# Patient Record
Sex: MALE | Race: BLACK OR AFRICAN AMERICAN | NOT HISPANIC OR LATINO | Employment: FULL TIME | URBAN - METROPOLITAN AREA
[De-identification: names, ages, dates, MRNs, and addresses within clinical notes are randomized per-mention and may not be internally consistent; named-entity substitution may affect disease eponyms.]

---

## 2023-03-22 ENCOUNTER — TELEPHONE (OUTPATIENT)
Dept: PRIMARY CARE | Facility: CLINIC | Age: 33
End: 2023-03-22

## 2023-06-12 ASSESSMENT — PROMIS GLOBAL HEALTH SCALE
RATE_GENERAL_HEALTH: EXCELLENT
RATE_AVERAGE_PAIN: 0
RATE_PHYSICAL_HEALTH: EXCELLENT
RATE_MENTAL_HEALTH: GOOD
CARRYOUT_PHYSICAL_ACTIVITIES: COMPLETELY
RATE_QUALITY_OF_LIFE: EXCELLENT
CARRYOUT_SOCIAL_ACTIVITIES: EXCELLENT
EMOTIONAL_PROBLEMS: RARELY
RATE_SOCIAL_SATISFACTION: EXCELLENT

## 2023-06-14 ENCOUNTER — LAB (OUTPATIENT)
Dept: LAB | Facility: LAB | Age: 33
End: 2023-06-14
Payer: MEDICAID

## 2023-06-14 ENCOUNTER — OFFICE VISIT (OUTPATIENT)
Dept: PRIMARY CARE | Facility: CLINIC | Age: 33
End: 2023-06-14
Payer: MEDICAID

## 2023-06-14 VITALS
WEIGHT: 215 LBS | HEART RATE: 97 BPM | BODY MASS INDEX: 29.57 KG/M2 | TEMPERATURE: 97.6 F | OXYGEN SATURATION: 97 % | SYSTOLIC BLOOD PRESSURE: 155 MMHG | DIASTOLIC BLOOD PRESSURE: 75 MMHG

## 2023-06-14 DIAGNOSIS — Z00.00 HEALTHCARE MAINTENANCE: Primary | ICD-10-CM

## 2023-06-14 DIAGNOSIS — Z00.00 HEALTHCARE MAINTENANCE: ICD-10-CM

## 2023-06-14 DIAGNOSIS — I10 HYPERTENSION, UNSPECIFIED TYPE: ICD-10-CM

## 2023-06-14 PROBLEM — E66.3 OVERWEIGHT: Status: ACTIVE | Noted: 2023-06-14

## 2023-06-14 LAB
ALANINE AMINOTRANSFERASE (SGPT) (U/L) IN SER/PLAS: 29 U/L (ref 10–52)
ALBUMIN (G/DL) IN SER/PLAS: 4.6 G/DL (ref 3.4–5)
ALKALINE PHOSPHATASE (U/L) IN SER/PLAS: 55 U/L (ref 33–120)
ANION GAP IN SER/PLAS: 13 MMOL/L (ref 10–20)
ASPARTATE AMINOTRANSFERASE (SGOT) (U/L) IN SER/PLAS: 16 U/L (ref 9–39)
BASOPHILS (10*3/UL) IN BLOOD BY AUTOMATED COUNT: 0.06 X10E9/L (ref 0–0.1)
BASOPHILS/100 LEUKOCYTES IN BLOOD BY AUTOMATED COUNT: 1.1 % (ref 0–2)
BILIRUBIN TOTAL (MG/DL) IN SER/PLAS: 1.3 MG/DL (ref 0–1.2)
CALCIUM (MG/DL) IN SER/PLAS: 9.6 MG/DL (ref 8.6–10.6)
CARBON DIOXIDE, TOTAL (MMOL/L) IN SER/PLAS: 31 MMOL/L (ref 21–32)
CHLORIDE (MMOL/L) IN SER/PLAS: 102 MMOL/L (ref 98–107)
CHOLESTEROL (MG/DL) IN SER/PLAS: 187 MG/DL (ref 0–199)
CHOLESTEROL IN HDL (MG/DL) IN SER/PLAS: 56.7 MG/DL
CHOLESTEROL/HDL RATIO: 3.3
CREATININE (MG/DL) IN SER/PLAS: 1.28 MG/DL (ref 0.5–1.3)
EOSINOPHILS (10*3/UL) IN BLOOD BY AUTOMATED COUNT: 0.17 X10E9/L (ref 0–0.7)
EOSINOPHILS/100 LEUKOCYTES IN BLOOD BY AUTOMATED COUNT: 3.1 % (ref 0–6)
ERYTHROCYTE DISTRIBUTION WIDTH (RATIO) BY AUTOMATED COUNT: 12.7 % (ref 11.5–14.5)
ERYTHROCYTE MEAN CORPUSCULAR HEMOGLOBIN CONCENTRATION (G/DL) BY AUTOMATED: 34 G/DL (ref 32–36)
ERYTHROCYTE MEAN CORPUSCULAR VOLUME (FL) BY AUTOMATED COUNT: 92 FL (ref 80–100)
ERYTHROCYTES (10*6/UL) IN BLOOD BY AUTOMATED COUNT: 5.21 X10E12/L (ref 4.5–5.9)
GFR MALE: 75 ML/MIN/1.73M2
GLUCOSE (MG/DL) IN SER/PLAS: 88 MG/DL (ref 74–99)
HEMATOCRIT (%) IN BLOOD BY AUTOMATED COUNT: 47.9 % (ref 41–52)
HEMOGLOBIN (G/DL) IN BLOOD: 16.3 G/DL (ref 13.5–17.5)
IMMATURE GRANULOCYTES/100 LEUKOCYTES IN BLOOD BY AUTOMATED COUNT: 0.2 % (ref 0–0.9)
LDL: 82 MG/DL (ref 0–99)
LEUKOCYTES (10*3/UL) IN BLOOD BY AUTOMATED COUNT: 5.6 X10E9/L (ref 4.4–11.3)
LYMPHOCYTES (10*3/UL) IN BLOOD BY AUTOMATED COUNT: 2.73 X10E9/L (ref 1.2–4.8)
LYMPHOCYTES/100 LEUKOCYTES IN BLOOD BY AUTOMATED COUNT: 49.1 % (ref 13–44)
MONOCYTES (10*3/UL) IN BLOOD BY AUTOMATED COUNT: 0.6 X10E9/L (ref 0.1–1)
MONOCYTES/100 LEUKOCYTES IN BLOOD BY AUTOMATED COUNT: 10.8 % (ref 2–10)
NEUTROPHILS (10*3/UL) IN BLOOD BY AUTOMATED COUNT: 1.99 X10E9/L (ref 1.2–7.7)
NEUTROPHILS/100 LEUKOCYTES IN BLOOD BY AUTOMATED COUNT: 35.7 % (ref 40–80)
NON HDL CHOLESTEROL: 130 MG/DL
NRBC (PER 100 WBCS) BY AUTOMATED COUNT: 0 /100 WBC (ref 0–0)
PLATELETS (10*3/UL) IN BLOOD AUTOMATED COUNT: 182 X10E9/L (ref 150–450)
POTASSIUM (MMOL/L) IN SER/PLAS: 3.7 MMOL/L (ref 3.5–5.3)
PROTEIN TOTAL: 7.1 G/DL (ref 6.4–8.2)
SODIUM (MMOL/L) IN SER/PLAS: 142 MMOL/L (ref 136–145)
TRIGLYCERIDE (MG/DL) IN SER/PLAS: 240 MG/DL (ref 0–149)
UREA NITROGEN (MG/DL) IN SER/PLAS: 16 MG/DL (ref 6–23)
VLDL: 48 MG/DL (ref 0–40)

## 2023-06-14 PROCEDURE — 85025 COMPLETE CBC W/AUTO DIFF WBC: CPT

## 2023-06-14 PROCEDURE — 80053 COMPREHEN METABOLIC PANEL: CPT

## 2023-06-14 PROCEDURE — 1036F TOBACCO NON-USER: CPT | Performed by: STUDENT IN AN ORGANIZED HEALTH CARE EDUCATION/TRAINING PROGRAM

## 2023-06-14 PROCEDURE — 3077F SYST BP >= 140 MM HG: CPT | Performed by: STUDENT IN AN ORGANIZED HEALTH CARE EDUCATION/TRAINING PROGRAM

## 2023-06-14 PROCEDURE — 99395 PREV VISIT EST AGE 18-39: CPT | Performed by: STUDENT IN AN ORGANIZED HEALTH CARE EDUCATION/TRAINING PROGRAM

## 2023-06-14 PROCEDURE — 36415 COLL VENOUS BLD VENIPUNCTURE: CPT

## 2023-06-14 PROCEDURE — 80061 LIPID PANEL: CPT

## 2023-06-14 PROCEDURE — 3078F DIAST BP <80 MM HG: CPT | Performed by: STUDENT IN AN ORGANIZED HEALTH CARE EDUCATION/TRAINING PROGRAM

## 2023-06-14 RX ORDER — MELOXICAM 15 MG/1
15 TABLET ORAL
COMMUNITY
Start: 2021-01-13

## 2023-06-14 RX ORDER — CYCLOBENZAPRINE HCL 5 MG
TABLET ORAL
COMMUNITY
Start: 2017-11-03 | End: 2024-01-23 | Stop reason: SDUPTHER

## 2023-06-14 RX ORDER — HYDROCHLOROTHIAZIDE 25 MG/1
25 TABLET ORAL DAILY
COMMUNITY
Start: 2023-02-06 | End: 2023-06-14 | Stop reason: SDUPTHER

## 2023-06-14 RX ORDER — HYDROCHLOROTHIAZIDE 25 MG/1
25 TABLET ORAL DAILY
Qty: 90 TABLET | Refills: 3 | Status: SHIPPED | OUTPATIENT
Start: 2023-06-14 | End: 2024-06-13

## 2023-06-14 RX ORDER — AMLODIPINE BESYLATE 5 MG/1
1 TABLET ORAL DAILY
COMMUNITY
Start: 2021-12-15 | End: 2023-06-14 | Stop reason: SDUPTHER

## 2023-06-14 RX ORDER — NAPROXEN 500 MG/1
500 TABLET ORAL EVERY 12 HOURS PRN
COMMUNITY
Start: 2017-09-07

## 2023-06-14 RX ORDER — AMLODIPINE BESYLATE 5 MG/1
5 TABLET ORAL DAILY
Qty: 90 TABLET | Refills: 3 | Status: SHIPPED | OUTPATIENT
Start: 2023-06-14 | End: 2024-06-13

## 2023-06-14 NOTE — PATIENT INSTRUCTIONS
Please stop at the lab ([address]) to complete your blood and/or urine work that I've ordered for you.    I will contact you with the results at my soonest convenience. I strongly urge you to use "[employer] as this is the quickest and easiest way to access your results and recieve my correspondences.    I have renewed your chronic medications today    Shoot me a message if there are any issues with your medications.

## 2023-06-14 NOTE — PROGRESS NOTES
"Subjective   Patient ID: Tricia Dykes is a 32 y.o. male who presents for No chief complaint on file..    HPI   No acute concerns today. Here for annual.    Re: HTN  - Not at goal today. Compliance has been an issue \"ever since the  switched\". Willing to go back on his meds though. Reports no sx high or low from HTN; denies blurry vision, HA, dizziness LoC CP SoB Marcum and leg swelling     Re: Prostate - see urology note. Reassurance given.     PMHx, FHx, Social Hx, Surg Hx personally reviewed at this appointment. No pertinent findings and/or changes from prior (if applicable).    ROS: Denies wt gain/loss f/c HA LoC CP SOB NVDC. See HPI above, and scanned sheet (if applicable). All other systems are reviewed and are without complaint.     Review of Systems    Objective   /75   Pulse 97   Temp 36.4 °C (97.6 °F)   Wt 97.5 kg (215 lb)   SpO2 97%   BMI 29.57 kg/m²     Physical Exam  Gen: overweight, NAD. AAO x3.  HEENT: NC/AT. Anicteric sclera, symmetric pupils. MMM no thrush.  Neck: Soft, supple. No LAD. No goiter.   CV: RRR nl s1s2 no m/r/g  Pulm: CTAB no w/r/r, good air exchange  GI: soft NTND BS+ no hsm  Ext: WWP no edema  Neuro: II-XII grossly intact, nonfocal systemic findings  MSK: 5/5 strength b/l UE and LE  Gait: unremarkable    Lab Results   Component Value Date    WBC 5.0 10/31/2022    HGB 17.1 10/31/2022    HCT 50.1 10/31/2022     10/31/2022    CHOL 194 06/09/2021    TRIG 202 (H) 06/09/2021    HDL 51.9 06/09/2021    ALT 29 10/31/2022    AST 18 10/31/2022     10/31/2022    K 4.1 10/31/2022     10/31/2022    CREATININE 1.24 10/31/2022    BUN 13 10/31/2022    CO2 30 10/31/2022    PSA 0.49 10/31/2022    INR 1.1 03/14/2022    HGBA1C 5.4 06/09/2021     par     Electrocardiogram 12 Lead  Normal sinus rhythm  Normal ECG  No previous ECGs available  Please see ED Provider Note for formal interpretation  Confirmed by NIK ACOSTA MD ([id number]) on 6/17/2021 5:40:24 " AM       Assessment/Plan '    # HTN: not at goal  - ambulatory pressures  - compliance with meds discussed at length  - renew chronic meds  - routine blood/urine work, if not recent  - lifestyle modifications discussed at length     # Obesity: BMI ~ 30  - counselled on wt loss via diet, exercise, and other lifestyle modifications    # Health Maintenance  - routine blood work  - Colon Cancer Screening: Not yet indicated   - PSA: Not yet indicated   - Immunizations: UTD  - AAA screening:  Not yet indicated       Problem List Items Addressed This Visit    None  Visit Diagnoses       Healthcare maintenance    -  Primary    Relevant Orders    CBC and Auto Differential    Comprehensive Metabolic Panel    Lipid Panel    Hypertension, unspecified type

## 2023-12-22 ENCOUNTER — HOSPITAL ENCOUNTER (EMERGENCY)
Facility: HOSPITAL | Age: 33
Discharge: HOME | End: 2023-12-22
Payer: MEDICAID

## 2023-12-22 VITALS
TEMPERATURE: 97.3 F | HEART RATE: 78 BPM | RESPIRATION RATE: 18 BRPM | BODY MASS INDEX: 28.71 KG/M2 | OXYGEN SATURATION: 98 % | HEIGHT: 72 IN | SYSTOLIC BLOOD PRESSURE: 142 MMHG | DIASTOLIC BLOOD PRESSURE: 98 MMHG | WEIGHT: 212 LBS

## 2023-12-22 DIAGNOSIS — Z20.2 STD EXPOSURE: Primary | ICD-10-CM

## 2023-12-22 LAB — HIV 1+2 AB+HIV1 P24 AG SERPL QL IA: NONREACTIVE

## 2023-12-22 PROCEDURE — 87661 TRICHOMONAS VAGINALIS AMPLIF: CPT | Mod: 59,AHULAB | Performed by: PHYSICIAN ASSISTANT

## 2023-12-22 PROCEDURE — 99283 EMERGENCY DEPT VISIT LOW MDM: CPT

## 2023-12-22 PROCEDURE — 87800 DETECT AGNT MULT DNA DIREC: CPT | Mod: AHULAB | Performed by: PHYSICIAN ASSISTANT

## 2023-12-22 PROCEDURE — 87389 HIV-1 AG W/HIV-1&-2 AB AG IA: CPT | Mod: AHULAB | Performed by: PHYSICIAN ASSISTANT

## 2023-12-22 PROCEDURE — 86780 TREPONEMA PALLIDUM: CPT | Mod: AHULAB | Performed by: PHYSICIAN ASSISTANT

## 2023-12-22 PROCEDURE — 87109 MYCOPLASMA: CPT | Performed by: PHYSICIAN ASSISTANT

## 2023-12-22 PROCEDURE — 36415 COLL VENOUS BLD VENIPUNCTURE: CPT | Performed by: PHYSICIAN ASSISTANT

## 2023-12-22 RX ORDER — DOXYCYCLINE 100 MG/1
100 CAPSULE ORAL 2 TIMES DAILY
Qty: 20 CAPSULE | Refills: 0 | Status: SHIPPED | OUTPATIENT
Start: 2023-12-22 | End: 2024-01-01

## 2023-12-22 ASSESSMENT — COLUMBIA-SUICIDE SEVERITY RATING SCALE - C-SSRS
2. HAVE YOU ACTUALLY HAD ANY THOUGHTS OF KILLING YOURSELF?: NO
6. HAVE YOU EVER DONE ANYTHING, STARTED TO DO ANYTHING, OR PREPARED TO DO ANYTHING TO END YOUR LIFE?: NO
1. IN THE PAST MONTH, HAVE YOU WISHED YOU WERE DEAD OR WISHED YOU COULD GO TO SLEEP AND NOT WAKE UP?: NO

## 2023-12-22 NOTE — ED PROVIDER NOTES
HPI   Chief Complaint   Patient presents with    Exposure to STD       33-year-old male presents requesting testing for STD.  Partner reported being treated for Ureaplasma infection with doxycycline.  Patient is asymptomatic no urethral discharge dysuria frequency urgency hematuria.  No penile scrotal or testicular pain or swelling.  No abdominal pelvic pain.  No joint pain.  No rashes.  History of hypertension no other chronic medical conditions.      History provided by:  Patient   used: No                        No data recorded                Patient History   Past Medical History:   Diagnosis Date    Elevated blood-pressure reading, without diagnosis of hypertension 03/20/2019    Elevated BP without diagnosis of hypertension    Headache, unspecified 03/20/2019    Chronic headaches    Personal history of other diseases of the nervous system and sense organs 03/20/2019    History of tension headache    Right lower quadrant pain 02/16/2016    Abdominal pain, RLQ (right lower quadrant)     No past surgical history on file.  No family history on file.  Social History     Tobacco Use    Smoking status: Never    Smokeless tobacco: Never   Substance Use Topics    Alcohol use: Not Currently    Drug use: Never       Physical Exam   ED Triage Vitals [12/22/23 1340]   Temp Heart Rate Resp BP   36.3 °C (97.3 °F) 76 15 160/82      SpO2 Temp src Heart Rate Source Patient Position   98 % -- -- --      BP Location FiO2 (%)     -- --       Physical Exam  Vitals and nursing note reviewed.   Constitutional:       Appearance: Normal appearance.   HENT:      Head: Normocephalic and atraumatic.   Eyes:      Extraocular Movements: Extraocular movements intact.      Pupils: Pupils are equal, round, and reactive to light.   Cardiovascular:      Rate and Rhythm: Normal rate and regular rhythm.   Pulmonary:      Effort: Pulmonary effort is normal.   Abdominal:      Palpations: Abdomen is soft.      Tenderness: There is  no abdominal tenderness.   Musculoskeletal:         General: Normal range of motion.   Skin:     General: Skin is warm and dry.   Neurological:      General: No focal deficit present.      Mental Status: He is alert and oriented to person, place, and time.   Psychiatric:         Mood and Affect: Mood normal.         Behavior: Behavior normal.         Thought Content: Thought content normal.         Judgment: Judgment normal.         ED Course & MDM   Diagnoses as of 12/22/23 1422   STD exposure       Medical Decision Making  STD testing.  Patient is asymptomatic.  Known exposure to Ureaplasma.    Prescribe doxycycline.  Patient aware that STD testing results in 3 days.  Will follow-up with phone bruce for results.  Advised if other infections present he may need further treatment.  Stable for discharge follow-up PCP or STD clinic.    Amount and/or Complexity of Data Reviewed  Labs: ordered.     Details: STD testing pending.  Patient aware results in 3 days.  Will follow-up with phone bruce for results.    Risk  Prescription drug management.        Procedure  Procedures     Nino Roman PA-C  12/22/23 142       Nino Roman PA-C  12/22/23 [id number]

## 2023-12-23 LAB
C TRACH RRNA SPEC QL NAA+PROBE: NEGATIVE
N GONORRHOEA DNA SPEC QL PROBE+SIG AMP: NEGATIVE
T PALLIDUM AB SER QL: NONREACTIVE
T VAGINALIS RRNA SPEC QL NAA+PROBE: NEGATIVE

## 2024-01-23 ENCOUNTER — OFFICE VISIT (OUTPATIENT)
Dept: PRIMARY CARE | Facility: CLINIC | Age: 34
End: 2024-01-23
Payer: MEDICAID

## 2024-01-23 ENCOUNTER — LAB (OUTPATIENT)
Dept: LAB | Facility: LAB | Age: 34
End: 2024-01-23
Payer: MEDICAID

## 2024-01-23 VITALS
TEMPERATURE: 97 F | SYSTOLIC BLOOD PRESSURE: 145 MMHG | HEIGHT: 72 IN | OXYGEN SATURATION: 97 % | HEART RATE: 83 BPM | BODY MASS INDEX: 27.09 KG/M2 | WEIGHT: 200 LBS | DIASTOLIC BLOOD PRESSURE: 79 MMHG

## 2024-01-23 DIAGNOSIS — S16.1XXD STRAIN OF NECK MUSCLE, SUBSEQUENT ENCOUNTER: ICD-10-CM

## 2024-01-23 DIAGNOSIS — Z00.00 HEALTHCARE MAINTENANCE: Primary | ICD-10-CM

## 2024-01-23 DIAGNOSIS — I10 HYPERTENSION, UNSPECIFIED TYPE: ICD-10-CM

## 2024-01-23 DIAGNOSIS — Z00.00 HEALTHCARE MAINTENANCE: ICD-10-CM

## 2024-01-23 LAB
ALBUMIN SERPL BCP-MCNC: 4.6 G/DL (ref 3.4–5)
ALP SERPL-CCNC: 57 U/L (ref 33–120)
ALT SERPL W P-5'-P-CCNC: 28 U/L (ref 10–52)
ANION GAP SERPL CALC-SCNC: 12 MMOL/L (ref 10–20)
AST SERPL W P-5'-P-CCNC: 13 U/L (ref 9–39)
BASOPHILS # BLD AUTO: 0.05 X10*3/UL (ref 0–0.1)
BASOPHILS NFR BLD AUTO: 0.7 %
BILIRUB SERPL-MCNC: 1.8 MG/DL (ref 0–1.2)
BUN SERPL-MCNC: 16 MG/DL (ref 6–23)
CALCIUM SERPL-MCNC: 9.9 MG/DL (ref 8.6–10.6)
CHLORIDE SERPL-SCNC: 103 MMOL/L (ref 98–107)
CHOLEST SERPL-MCNC: 155 MG/DL (ref 0–199)
CHOLESTEROL/HDL RATIO: 3.1
CO2 SERPL-SCNC: 29 MMOL/L (ref 21–32)
CREAT SERPL-MCNC: 1.2 MG/DL (ref 0.5–1.3)
EGFRCR SERPLBLD CKD-EPI 2021: 81 ML/MIN/1.73M*2
EOSINOPHIL # BLD AUTO: 0.12 X10*3/UL (ref 0–0.7)
EOSINOPHIL NFR BLD AUTO: 1.8 %
ERYTHROCYTE [DISTWIDTH] IN BLOOD BY AUTOMATED COUNT: 12.7 % (ref 11.5–14.5)
GLUCOSE SERPL-MCNC: 99 MG/DL (ref 74–99)
HCT VFR BLD AUTO: 48.9 % (ref 41–52)
HDLC SERPL-MCNC: 49.7 MG/DL
HGB BLD-MCNC: 16.5 G/DL (ref 13.5–17.5)
IMM GRANULOCYTES # BLD AUTO: 0.02 X10*3/UL (ref 0–0.7)
IMM GRANULOCYTES NFR BLD AUTO: 0.3 % (ref 0–0.9)
LDLC SERPL CALC-MCNC: 81 MG/DL
LYMPHOCYTES # BLD AUTO: 2.06 X10*3/UL (ref 1.2–4.8)
LYMPHOCYTES NFR BLD AUTO: 30.2 %
MCH RBC QN AUTO: 30.6 PG (ref 26–34)
MCHC RBC AUTO-ENTMCNC: 33.7 G/DL (ref 32–36)
MCV RBC AUTO: 91 FL (ref 80–100)
MONOCYTES # BLD AUTO: 0.51 X10*3/UL (ref 0.1–1)
MONOCYTES NFR BLD AUTO: 7.5 %
NEUTROPHILS # BLD AUTO: 4.06 X10*3/UL (ref 1.2–7.7)
NEUTROPHILS NFR BLD AUTO: 59.5 %
NON HDL CHOLESTEROL: 105 MG/DL (ref 0–149)
NRBC BLD-RTO: 0 /100 WBCS (ref 0–0)
PLATELET # BLD AUTO: 181 X10*3/UL (ref 150–450)
POTASSIUM SERPL-SCNC: 3.4 MMOL/L (ref 3.5–5.3)
PROT SERPL-MCNC: 6.9 G/DL (ref 6.4–8.2)
RBC # BLD AUTO: 5.4 X10*6/UL (ref 4.5–5.9)
SODIUM SERPL-SCNC: 141 MMOL/L (ref 136–145)
TRIGL SERPL-MCNC: 121 MG/DL (ref 0–149)
VLDL: 24 MG/DL (ref 0–40)
WBC # BLD AUTO: 6.8 X10*3/UL (ref 4.4–11.3)

## 2024-01-23 PROCEDURE — 3078F DIAST BP <80 MM HG: CPT | Performed by: STUDENT IN AN ORGANIZED HEALTH CARE EDUCATION/TRAINING PROGRAM

## 2024-01-23 PROCEDURE — 85025 COMPLETE CBC W/AUTO DIFF WBC: CPT

## 2024-01-23 PROCEDURE — 80061 LIPID PANEL: CPT

## 2024-01-23 PROCEDURE — 99395 PREV VISIT EST AGE 18-39: CPT | Performed by: STUDENT IN AN ORGANIZED HEALTH CARE EDUCATION/TRAINING PROGRAM

## 2024-01-23 PROCEDURE — 1036F TOBACCO NON-USER: CPT | Performed by: STUDENT IN AN ORGANIZED HEALTH CARE EDUCATION/TRAINING PROGRAM

## 2024-01-23 PROCEDURE — 3077F SYST BP >= 140 MM HG: CPT | Performed by: STUDENT IN AN ORGANIZED HEALTH CARE EDUCATION/TRAINING PROGRAM

## 2024-01-23 PROCEDURE — 80053 COMPREHEN METABOLIC PANEL: CPT

## 2024-01-23 PROCEDURE — 36415 COLL VENOUS BLD VENIPUNCTURE: CPT

## 2024-01-23 RX ORDER — CYCLOBENZAPRINE HCL 10 MG
10 TABLET ORAL 3 TIMES DAILY
Qty: 30 TABLET | Refills: 3 | Status: SHIPPED | OUTPATIENT
Start: 2024-01-23 | End: 2024-02-02

## 2024-01-23 NOTE — PROGRESS NOTES
Subjective   Patient ID: Tricia Dykes is a 33 y.o. male who presents for No chief complaint on file..    HPI     Annual;    Re: HTN  - Not at goal today. Didn't take his meds today as he sustained a minor neck injury. Reports no sx high or low from HTN; denies blurry vision, HA, dizziness LoC CP SoB Marcum and leg swelling      Re: neck - sustained minor neck injury with a fall on the ice this morning as it was very slippery. Requesting work note and meds to help him. Requesting one day off of work to help get better too. Reasonable.       PMHx, FHx, Social Hx, Surg Hx personally reviewed at this appointment. No pertinent findings and/or changes from prior (if applicable).     ROS: Denies wt gain/loss f/c HA LoC CP SOB NVDC. See HPI above, and scanned sheet (if applicable). All other systems are reviewed and are without complaint.       Review of Systems    Objective   /79   Pulse 83   Temp 36.1 °C (97 °F)   Ht 1.829 m (6')   Wt 90.7 kg (200 lb)   SpO2 97%   BMI 27.12 kg/m²     Physical Exam  Gen: overweight, NAD. AAO x3.  HEENT: NC/AT. Anicteric sclera, symmetric pupils. MMM no thrush.  Neck: Soft, supple. No LAD. No goiter.   CV: RRR nl s1s2 no m/r/g  Pulm: CTAB no w/r/r, good air exchange  GI: soft NTND BS+ no hsm  Ext: WWP no edema  Neuro: II-XII grossly intact, nonfocal systemic findings  MSK: 5/5 strength b/l UE and LE  Gait: unremarkable    Lab Results   Component Value Date    WBC 5.6 06/14/2023    HGB 16.3 06/14/2023    HCT 47.9 06/14/2023     06/14/2023    CHOL 187 06/14/2023    TRIG 240 (H) 06/14/2023    HDL 56.7 06/14/2023    ALT 29 06/14/2023    AST 16 06/14/2023     06/14/2023    K 3.7 06/14/2023     06/14/2023    CREATININE 1.28 06/14/2023    BUN 16 06/14/2023    CO2 31 06/14/2023    PSA 0.49 10/31/2022    INR 1.1 03/14/2022    HGBA1C 5.4 06/09/2021     par    Electrocardiogram 12 Lead  Normal sinus rhythm  Normal ECG  No previous ECGs available  Please see ED Provider Note  for formal interpretation  Confirmed by NIK ACOSTA MD ([id number]) on 6/17/2021 5:40:24 AM         Assessment/Plan   # HTN: not at goal  - ambulatory pressures  - compliance with meds discussed at length  - renew chronic meds  - lifestyle modifications discussed at length      # Overweight: trending in right direction  - continue lifestyle modifications    # neck pain: subacute on chronic   - conservative management with PT, massage therapy, acupuncture, or chiropractic services  - trial of medrol dosepak and/or PRN cyclobenzaprine   - if above ineffective, will re-evaluate and consider imaging at that time      # Health Maintenance  - routine blood work  - Colon Cancer Screening: Not yet indicated   - PSA: Not yet indicated   - Immunizations: UTD  - AAA screening:  Not yet indicated

## 2024-01-23 NOTE — LETTER
January 23, 2024     Patient: Tricia Dykes   YOB: 1990   Date of Visit: 1/23/2024       To Whom It May Concern:    Tricia Dykes was seen in my clinic on 1/23/2024 at 2:20 pm. Please excuse Tricia for his absence from work on this day to make the appointment.     He also suffered a recent neck strain. Please excuse him from work on 1/24/2024 if his pain does not improve by tomorrow. I anticipate him back to work without restriction by Thursday 1/25/2024.     If you have any questions or concerns, please don't hesitate to call.         Sincerely,         Justin Hess MD        CC: No Recipients

## 2024-01-23 NOTE — PATIENT INSTRUCTIONS
Please stop at the lab ([address]) to complete your blood and/or urine work that I've ordered for you.    I will contact you with the results at my soonest convenience. I strongly urge you to use [employer] as this is the quickest and easiest way to access your results and receive my correspondences.     I have renewed your chronic medications today.     I have given you a work note.

## 2024-05-30 ENCOUNTER — APPOINTMENT (OUTPATIENT)
Dept: PRIMARY CARE | Facility: CLINIC | Age: 34
End: 2024-05-30
Payer: MEDICAID

## 2024-06-04 ENCOUNTER — APPOINTMENT (OUTPATIENT)
Dept: PRIMARY CARE | Facility: CLINIC | Age: 34
End: 2024-06-04
Payer: MEDICAID

## 2024-07-17 ENCOUNTER — DOCUMENTATION (OUTPATIENT)
Dept: GENETICS | Facility: CLINIC | Age: 34
End: 2024-07-17
Payer: MEDICAID

## 2024-07-17 ENCOUNTER — LAB (OUTPATIENT)
Dept: LAB | Facility: LAB | Age: 34
End: 2024-07-17
Payer: MEDICAID

## 2024-07-17 DIAGNOSIS — Z31.440 ENCOUNTER OF MALE FOR TESTING FOR GENETIC DISEASE CARRIER STATUS FOR PROCREATIVE MANAGEMENT: ICD-10-CM

## 2024-07-17 NOTE — PROGRESS NOTES
Patient was present for his partner's (Wellington Siddiqui) prenatal genetic counseling appointment on 07/17/2024. She is a carrier of alpha-thal trait and HbC trait. After a review of the methodology, benefits, and limitations of carrier screening, he elected to proceed with the 176-gene universal carrier screen from [facility]. He was handed a kit and was informed that he can go to any  lab service location to have his blood drawn. Results will be available 2-3 weeks after his blood is drawn, and I will call him to review results and discuss next steps when they are available.     Sincerely,   Thea Morris MS, [facility] [place]  Licensed and Certified Genetic Counselor   [phone]    Reviewed by:  Dr. Saniya Mcwilliams MD  Maternal Fetal Medicine

## 2024-07-19 ASSESSMENT — PROMIS GLOBAL HEALTH SCALE
RATE_PHYSICAL_HEALTH: EXCELLENT
RATE_GENERAL_HEALTH: EXCELLENT
RATE_SOCIAL_SATISFACTION: EXCELLENT
RATE_MENTAL_HEALTH: EXCELLENT
RATE_AVERAGE_PAIN: 0
CARRYOUT_PHYSICAL_ACTIVITIES: COMPLETELY
EMOTIONAL_PROBLEMS: NEVER
CARRYOUT_SOCIAL_ACTIVITIES: EXCELLENT
RATE_QUALITY_OF_LIFE: EXCELLENT

## 2024-07-24 ENCOUNTER — LAB (OUTPATIENT)
Dept: LAB | Facility: LAB | Age: 34
End: 2024-07-24
Payer: MEDICAID

## 2024-07-24 ENCOUNTER — APPOINTMENT (OUTPATIENT)
Dept: PRIMARY CARE | Facility: CLINIC | Age: 34
End: 2024-07-24
Payer: MEDICAID

## 2024-07-24 VITALS
HEIGHT: 72 IN | TEMPERATURE: 97.8 F | DIASTOLIC BLOOD PRESSURE: 88 MMHG | SYSTOLIC BLOOD PRESSURE: 152 MMHG | HEART RATE: 73 BPM | WEIGHT: 215 LBS | BODY MASS INDEX: 29.12 KG/M2

## 2024-07-24 DIAGNOSIS — N40.0 ENLARGED PROSTATE: ICD-10-CM

## 2024-07-24 DIAGNOSIS — I10 BENIGN ESSENTIAL HYPERTENSION: ICD-10-CM

## 2024-07-24 DIAGNOSIS — Z00.00 HEALTHCARE MAINTENANCE: ICD-10-CM

## 2024-07-24 DIAGNOSIS — I10 HYPERTENSION, UNSPECIFIED TYPE: ICD-10-CM

## 2024-07-24 DIAGNOSIS — E66.3 OVERWEIGHT: ICD-10-CM

## 2024-07-24 DIAGNOSIS — I10 BENIGN ESSENTIAL HYPERTENSION: Primary | ICD-10-CM

## 2024-07-24 DIAGNOSIS — L30.9 DERMATITIS: ICD-10-CM

## 2024-07-24 LAB
ALBUMIN SERPL BCP-MCNC: 4.6 G/DL (ref 3.4–5)
ALP SERPL-CCNC: 59 U/L (ref 33–120)
ALT SERPL W P-5'-P-CCNC: 42 U/L (ref 10–52)
ANION GAP SERPL CALC-SCNC: 12 MMOL/L (ref 10–20)
AST SERPL W P-5'-P-CCNC: 18 U/L (ref 9–39)
BASOPHILS # BLD AUTO: 0.06 X10*3/UL (ref 0–0.1)
BASOPHILS NFR BLD AUTO: 1.3 %
BILIRUB SERPL-MCNC: 1.4 MG/DL (ref 0–1.2)
BUN SERPL-MCNC: 14 MG/DL (ref 6–23)
CALCIUM SERPL-MCNC: 9.3 MG/DL (ref 8.6–10.6)
CHLORIDE SERPL-SCNC: 103 MMOL/L (ref 98–107)
CO2 SERPL-SCNC: 29 MMOL/L (ref 21–32)
CREAT SERPL-MCNC: 1.18 MG/DL (ref 0.5–1.3)
EGFRCR SERPLBLD CKD-EPI 2021: 82 ML/MIN/1.73M*2
EOSINOPHIL # BLD AUTO: 0.12 X10*3/UL (ref 0–0.7)
EOSINOPHIL NFR BLD AUTO: 2.6 %
ERYTHROCYTE [DISTWIDTH] IN BLOOD BY AUTOMATED COUNT: 12.7 % (ref 11.5–14.5)
EST. AVERAGE GLUCOSE BLD GHB EST-MCNC: 105 MG/DL
GLUCOSE SERPL-MCNC: 89 MG/DL (ref 74–99)
HBA1C MFR BLD: 5.3 %
HCT VFR BLD AUTO: 47.9 % (ref 41–52)
HCV AB SER QL: NONREACTIVE
HGB BLD-MCNC: 16.4 G/DL (ref 13.5–17.5)
HIV 1+2 AB+HIV1 P24 AG SERPL QL IA: NONREACTIVE
IMM GRANULOCYTES # BLD AUTO: 0.01 X10*3/UL (ref 0–0.7)
IMM GRANULOCYTES NFR BLD AUTO: 0.2 % (ref 0–0.9)
LYMPHOCYTES # BLD AUTO: 2.32 X10*3/UL (ref 1.2–4.8)
LYMPHOCYTES NFR BLD AUTO: 50.4 %
MCH RBC QN AUTO: 30.7 PG (ref 26–34)
MCHC RBC AUTO-ENTMCNC: 34.2 G/DL (ref 32–36)
MCV RBC AUTO: 90 FL (ref 80–100)
MONOCYTES # BLD AUTO: 0.36 X10*3/UL (ref 0.1–1)
MONOCYTES NFR BLD AUTO: 7.8 %
NEUTROPHILS # BLD AUTO: 1.73 X10*3/UL (ref 1.2–7.7)
NEUTROPHILS NFR BLD AUTO: 37.7 %
NRBC BLD-RTO: 0 /100 WBCS (ref 0–0)
PLATELET # BLD AUTO: 185 X10*3/UL (ref 150–450)
POTASSIUM SERPL-SCNC: 3.5 MMOL/L (ref 3.5–5.3)
PROT SERPL-MCNC: 7.2 G/DL (ref 6.4–8.2)
PSA SERPL-MCNC: 0.65 NG/ML
RBC # BLD AUTO: 5.34 X10*6/UL (ref 4.5–5.9)
SODIUM SERPL-SCNC: 140 MMOL/L (ref 136–145)
WBC # BLD AUTO: 4.6 X10*3/UL (ref 4.4–11.3)

## 2024-07-24 PROCEDURE — 87389 HIV-1 AG W/HIV-1&-2 AB AG IA: CPT

## 2024-07-24 PROCEDURE — 3008F BODY MASS INDEX DOCD: CPT | Performed by: STUDENT IN AN ORGANIZED HEALTH CARE EDUCATION/TRAINING PROGRAM

## 2024-07-24 PROCEDURE — 83036 HEMOGLOBIN GLYCOSYLATED A1C: CPT

## 2024-07-24 PROCEDURE — 3079F DIAST BP 80-89 MM HG: CPT | Performed by: STUDENT IN AN ORGANIZED HEALTH CARE EDUCATION/TRAINING PROGRAM

## 2024-07-24 PROCEDURE — 3077F SYST BP >= 140 MM HG: CPT | Performed by: STUDENT IN AN ORGANIZED HEALTH CARE EDUCATION/TRAINING PROGRAM

## 2024-07-24 PROCEDURE — 80053 COMPREHEN METABOLIC PANEL: CPT

## 2024-07-24 PROCEDURE — 85025 COMPLETE CBC W/AUTO DIFF WBC: CPT

## 2024-07-24 PROCEDURE — 99214 OFFICE O/P EST MOD 30 MIN: CPT | Performed by: STUDENT IN AN ORGANIZED HEALTH CARE EDUCATION/TRAINING PROGRAM

## 2024-07-24 PROCEDURE — 36415 COLL VENOUS BLD VENIPUNCTURE: CPT

## 2024-07-24 PROCEDURE — 84153 ASSAY OF PSA TOTAL: CPT

## 2024-07-24 PROCEDURE — 86803 HEPATITIS C AB TEST: CPT

## 2024-07-24 RX ORDER — TRIAMCINOLONE ACETONIDE 1 MG/G
CREAM TOPICAL 2 TIMES DAILY PRN
Qty: 453.6 G | Refills: 0 | Status: SHIPPED | OUTPATIENT
Start: 2024-07-24

## 2024-07-24 RX ORDER — HYDROCHLOROTHIAZIDE 50 MG/1
50 TABLET ORAL DAILY
Qty: 90 TABLET | Refills: 3 | Status: SHIPPED | OUTPATIENT
Start: 2024-07-24 | End: 2025-07-24

## 2024-07-24 RX ORDER — AMLODIPINE BESYLATE 10 MG/1
10 TABLET ORAL DAILY
Qty: 90 TABLET | Refills: 3 | Status: SHIPPED | OUTPATIENT
Start: 2024-07-24 | End: 2025-07-24

## 2024-07-24 NOTE — PATIENT INSTRUCTIONS
Please stop at the lab ([address]) to complete your blood and/or urine work that I've ordered for you.    I will contact you with the results at my soonest convenience. I strongly urge you to use [employer] as this is the quickest and easiest way to access your results and receive my correspondences.    I have added or changed your medications today. See the medication section of this packet for full details.      Your blood pressure is not at target. Check your blood pressure daily with the newly increased doses.    Further recommendations will be made based on test results and how you fare clinically.

## 2024-07-24 NOTE — PROGRESS NOTES
Subjective   Patient ID: Tricia Dykes is a 34 y.o. male who presents for No chief complaint on file..    HPI   CPE already done this year, office visit.    Re: weight - up 15lb (200 -> 215) since last in. Still cycling regularly. He thinks most of the weight is muscle.     Re: HTN  - Not at goal today. Reports that he's taking the medications but pressures still not at goal.     Re: Dermatitis - small dermatitis on his hands. Requesting steroid cream to treat this as OTCs only so-so effective.     Re: prostate - mildly enlarged prostate on CT. Seen by urology, reassurance was provided. Patient requesting repeat PSA which is reasonable.      PMHx, FHx, Social Hx, Surg Hx personally reviewed at this appointment. No pertinent findings and/or changes from prior (if applicable).     ROS: Denies wt gain/loss f/c HA LoC CP SOB NVDC. See HPI above, and scanned sheet (if applicable). All other systems are reviewed and are without complaint.         Review of Systems    Objective   /88   Pulse 73   Temp 36.6 °C (97.8 °F)   Ht 1.829 m (6')   Wt 97.5 kg (215 lb)   BMI 29.16 kg/m²     Physical Exam  Gen: overweight, NAD. AAO x3.  HEENT: NC/AT. Anicteric sclera, symmetric pupils. MMM no thrush.  Neck: Soft, supple. No LAD. No goiter.   CV: RRR nl s1s2 no m/r/g  Pulm: CTAB no w/r/r, good air exchange  GI: soft NTND BS+ no hsm  Ext: WWP no edema  Neuro: II-XII grossly intact, nonfocal systemic findings  MSK: 5/5 strength b/l UE and LE  Gait: unremarkable  Skin: mild dermatitis on knuckles.     Lab Results   Component Value Date    WBC 6.8 01/23/2024    HGB 16.5 01/23/2024    HCT 48.9 01/23/2024     01/23/2024    CHOL 155 01/23/2024    TRIG 121 01/23/2024    HDL 49.7 01/23/2024    ALT 28 01/23/2024    AST 13 01/23/2024     01/23/2024    K 3.4 (L) 01/23/2024     01/23/2024    CREATININE 1.20 01/23/2024    BUN 16 01/23/2024    CO2 29 01/23/2024    PSA 0.49 10/31/2022    INR 1.1 03/14/2022    HGBA1C 5.4  06/09/2021     par    Electrocardiogram 12 Lead  Normal sinus rhythm  Normal ECG  No previous ECGs available  Please see ED Provider Note for formal interpretation  Confirmed by NIK ACOSTA MD ([id number]) on 6/17/2021 5:40:24 AM       Current Outpatient Medications   Medication Instructions    amLODIPine (NORVASC) 10 mg, oral, Daily    hydroCHLOROthiazide (HYDRODIURIL) 50 mg, oral, Daily, as directed    meloxicam (MOBIC) 15 mg, oral, Daily RT    naproxen (NAPROSYN) 500 mg, oral, Every 12 hours PRN        Assessment/Plan   Overall in stable health since last in. Would benefit from tighter BP control, will uptitrate those meds.     # HTN: not at goal  - ambulatory pressures, RTC 4-8 weeks with BP log  - routine blood/urine work, if not recent  - lifestyle modifications discussed at length   - increase CCB and hydrochlorothiazide    # Dermatitis  - steroid cream prescribed  - referral to dermatology if does not improve with this      # Overweight: mild uptrend  - continue lifestyle modifications    # mildly enlarged prostate  - repeat PSA requested, ordered     # Health Maintenance  - routine blood work  - Colon Cancer Screening: Not yet indicated   - PSA: as above  - Immunizations: UTD  - AAA screening:  Not yet indicated

## 2024-07-29 ENCOUNTER — TELEPHONE (OUTPATIENT)
Dept: GENETICS | Facility: CLINIC | Age: 34
End: 2024-07-29
Payer: MEDICAID

## 2024-07-29 NOTE — TELEPHONE ENCOUNTER
"The results of the 175-gene expanded carrier screen from [facility] were shared with Mr. Tricia Dykes ( 1990) and his partner, Wellington Siddiqui ( 1995) today by phone. The couple gave permission to share results with one another and to have this conversation documented together.     The technology, benefits, and limitations of carrier screening were again reviewed, including that it does not screen for all genetic conditions. Variants of unknown significance are not reported. Actual reproductive risks may be higher than quoted due to this fact, as well as the potential for some conditions to have digenic inheritance.     For the majority of the conditions tested, which are autosomal recessive, if a partner of a pregnancy is also a carrier, the chance to have an affected child is 1 in 4 (or 25%). On the other hand, for the autosomal recessive conditions tested, the chance to have a child with the condition is considerably lower if only one parent is a carrier for the condition. Normal carrier screening reduces the likelihood that a person is a carrier, but does not eliminate it. In most cases, carriers of an autosomal recessive genetic disorder are not expected to have symptoms.     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 Based on Wellington's previously negative carrier screen for this condition, she has a residual risk of 1 in 25,000 chance of being a carrier of this condition as well, and therefore there is currently a 1 in 100,000 risk of having a future affected child. Per the [employer] report, \"Isovaleric Acidemia (EVA), caused by mutations in theIVDgene, is an inherited disorder in which an enzyme that breaks down theamino acid leucine does not function properly resulting in accumulation isovaleric " "acid in the blood. High levels of isovaleric acid inthe blood can be toxic and result in damage to the brain and nervous system. EVA can result in seizures, coma, or other nervoussystem disorders. EVA can also be life-threatening. However, with early diagnosis and treatment, individuals with EVA are more likely tohave improved growth, development, and life expectancy.\"  Wellington is also a carrier of Hemoglobin C trait and alpha thalassemia trait. Reproductive risks for a beta-chain hemoglobinopathy or a severe form of alpha thalassemia are also <<1% based on Tricia's negative carrier status for those conditions.  Autosomal recessive inheritance. In these cases, both parents must have one nonworking copy of the same gene. Current reproductive risks are described above, and are <<1%. We again reviewed that had they been carriers for the same condition(s), reproductive risk would be 25% per pregnancy.   Other family members, Given Tricia's result, each of his parents and siblings are also at a 50% risk to be carriers of each condition described. Additionally, all future children will have a 50% chance of being a carrier of anything their parents are carriers for, and children should be made aware of this risk when they reach reproductive age. Should they desire carrier screening for future reproductive purposes, they can reach out to the [facility] for a prenatal or preconception appointment.  All babies born in the [facility] will be screened for a specific set of genetic conditions, including isovaleric acidemia, alpha thalassemia, and beta-chain related hemoglobinopathies, on the new born screen.    DISPOSITION:  Tricia Dykes and Wellington Siddiqui expressed understanding of the above information. No additional genetic testing is pending at this time.   Should they or their providers have additional questions about the information reviewed at today’s conversation, they can call my direct line at " [phone].    Sincerely,   Thea Morris MS, [facility] [place]  Licensed and Certified Genetic Counselor

## 2024-07-30 LAB — SCAN RESULT: NORMAL

## 2025-02-27 ENCOUNTER — APPOINTMENT (OUTPATIENT)
Dept: PRIMARY CARE | Facility: CLINIC | Age: 35
End: 2025-02-27
Payer: MEDICAID

## 2025-02-27 VITALS
SYSTOLIC BLOOD PRESSURE: 120 MMHG | TEMPERATURE: 96.9 F | BODY MASS INDEX: 29.12 KG/M2 | HEART RATE: 71 BPM | DIASTOLIC BLOOD PRESSURE: 79 MMHG | HEIGHT: 72 IN | WEIGHT: 215 LBS

## 2025-02-27 DIAGNOSIS — I10 HYPERTENSION, UNSPECIFIED TYPE: ICD-10-CM

## 2025-02-27 DIAGNOSIS — E66.3 OVERWEIGHT: ICD-10-CM

## 2025-02-27 DIAGNOSIS — E87.6 HYPOKALEMIA: ICD-10-CM

## 2025-02-27 DIAGNOSIS — I10 BENIGN ESSENTIAL HYPERTENSION: ICD-10-CM

## 2025-02-27 DIAGNOSIS — N40.0 ENLARGED PROSTATE: ICD-10-CM

## 2025-02-27 DIAGNOSIS — Z00.00 HEALTHCARE MAINTENANCE: Primary | ICD-10-CM

## 2025-02-27 PROCEDURE — 99395 PREV VISIT EST AGE 18-39: CPT | Performed by: STUDENT IN AN ORGANIZED HEALTH CARE EDUCATION/TRAINING PROGRAM

## 2025-02-27 PROCEDURE — 3074F SYST BP LT 130 MM HG: CPT | Performed by: STUDENT IN AN ORGANIZED HEALTH CARE EDUCATION/TRAINING PROGRAM

## 2025-02-27 PROCEDURE — 1036F TOBACCO NON-USER: CPT | Performed by: STUDENT IN AN ORGANIZED HEALTH CARE EDUCATION/TRAINING PROGRAM

## 2025-02-27 PROCEDURE — 3008F BODY MASS INDEX DOCD: CPT | Performed by: STUDENT IN AN ORGANIZED HEALTH CARE EDUCATION/TRAINING PROGRAM

## 2025-02-27 PROCEDURE — 3078F DIAST BP <80 MM HG: CPT | Performed by: STUDENT IN AN ORGANIZED HEALTH CARE EDUCATION/TRAINING PROGRAM

## 2025-02-27 RX ORDER — AMLODIPINE BESYLATE 10 MG/1
10 TABLET ORAL DAILY
Qty: 90 TABLET | Refills: 3 | Status: SHIPPED | OUTPATIENT
Start: 2025-02-27 | End: 2026-02-27

## 2025-02-27 RX ORDER — HYDROCHLOROTHIAZIDE 50 MG/1
50 TABLET ORAL DAILY
Qty: 90 TABLET | Refills: 3 | Status: SHIPPED | OUTPATIENT
Start: 2025-02-27 | End: 2026-02-27

## 2025-02-27 ASSESSMENT — PAIN SCALES - GENERAL: PAINLEVEL_OUTOF10: 0-NO PAIN

## 2025-02-27 ASSESSMENT — PATIENT HEALTH QUESTIONNAIRE - PHQ9
2. FEELING DOWN, DEPRESSED OR HOPELESS: NOT AT ALL
1. LITTLE INTEREST OR PLEASURE IN DOING THINGS: NOT AT ALL
SUM OF ALL RESPONSES TO PHQ9 QUESTIONS 1 AND 2: 0

## 2025-02-27 NOTE — PATIENT INSTRUCTIONS
Please stop at any  lab ([address] if you choose to use my building) to complete your blood and/or urine work that I've ordered for you.    I will contact you with the results at my soonest convenience. I strongly urge you to use [employer] as this is the quickest and easiest way to access your results and receive my correspondences.      I have renewed your chronic medications today.     I recommend that you lose weight via lifestyle modifications such as diet and exercise.     See me yearly for a complete physical exam, and sooner as needed for sick visits.

## 2025-02-27 NOTE — PROGRESS NOTES
Subjective   Patient ID: Tricia Dykes is a 34 y.o. male who presents for Annual Exam.    HPI   New job, doing [employer] now. Family doing well. Annual visit today.    Re: HTN - at goal on combo therapy. Reports no sx high or low from HTN; denies blurry vision, HA, dizziness LoC CP SoB Marcum and leg swelling     Re: neck pain -resolved.    Re: overweight - BMI 29. Contemplative about weight loss.    PMHx, FHx, Social Hx, Surg Hx personally reviewed at this appointment. No pertinent findings and/or changes from prior (if applicable).    ROS: Denies wt gain/loss f/c HA LoC CP SOB NVDC. See HPI above, and scanned sheet (if applicable). All other systems are reviewed and are without complaint.     Review of Systems    Objective   /79 (BP Location: Left arm, Patient Position: Sitting, BP Cuff Size: Adult)   Pulse 71   Temp 36.1 °C (96.9 °F) (Temporal)   Ht 1.829 m (6')   Wt 97.5 kg (215 lb)   BMI 29.16 kg/m²     Physical Exam  Gen: overweight, NAD. AAO x3.  HEENT: NC/AT. Anicteric sclera, symmetric pupils. MMM no thrush.  Neck: Soft, supple. No LAD. No goiter.   CV: RRR nl s1s2 no m/r/g  Pulm: CTAB no w/r/r, good air exchange  GI: soft NTND BS+ no hsm  Ext: WWP no edema  Neuro: II-XII grossly intact, nonfocal systemic findings  MSK: 5/5 strength b/l UE and LE  Gait: unremarkable  Skin: mild dermatitis on knuckles. Festooned with tattoos.     Lab Results   Component Value Date    WBC 4.6 07/24/2024    HGB 16.4 07/24/2024    HCT 47.9 07/24/2024     07/24/2024    CHOL 155 01/23/2024    TRIG 121 01/23/2024    HDL 49.7 01/23/2024    ALT 42 07/24/2024    AST 18 07/24/2024     07/24/2024    K 3.5 07/24/2024     07/24/2024    CREATININE 1.18 07/24/2024    BUN 14 07/24/2024    CO2 29 07/24/2024    PSA 0.65 07/24/2024    INR 1.1 03/14/2022    HGBA1C 5.3 07/24/2024     par    Electrocardiogram 12 Lead  Normal sinus rhythm  Normal ECG  No previous ECGs available  Please see ED Provider Note for  formal interpretation  Confirmed by NIK ACOSTA MD ([id number]) on 6/17/2021 5:40:24 AM      Current Outpatient Medications   Medication Instructions    amLODIPine (NORVASC) 10 mg, oral, Daily    hydroCHLOROthiazide (HYDRODIURIL) 50 mg, oral, Daily, as directed    meloxicam (MOBIC) 15 mg, oral, Daily RT    naproxen (NAPROSYN) 500 mg, oral, Every 12 hours PRN    triamcinolone (Kenalog) 0.1 % cream Topical, 2 times daily PRN, Apply to affected area 1-2 times daily as needed. Avoid face and groin.      Assessment/Plan   Stable since last in. BP controlled.    # HTN: at/near goal  - continue current regimen  - routine lab work if not recent  - continue lifestyle modifications     # Overweight  - counselled on weight loss via diet, exercise, and other lifestyle modifications      # Health Maintenance  - routine blood work  - Colon Cancer Screening: Not yet indicated   - PSA: due, ordered today   - Immunizations: Declined  - AAA screening:  not indicated

## 2025-02-28 LAB
ALBUMIN SERPL-MCNC: 5 G/DL (ref 3.6–5.1)
ALP SERPL-CCNC: 65 U/L (ref 36–130)
ALT SERPL-CCNC: 40 U/L (ref 9–46)
ANION GAP SERPL CALCULATED.4IONS-SCNC: 10 MMOL/L (CALC) (ref 7–17)
AST SERPL-CCNC: 18 U/L (ref 10–40)
BASOPHILS # BLD AUTO: 61 CELLS/UL (ref 0–200)
BASOPHILS NFR BLD AUTO: 1.2 %
BILIRUB SERPL-MCNC: 1.4 MG/DL (ref 0.2–1.2)
BUN SERPL-MCNC: 11 MG/DL (ref 7–25)
CALCIUM SERPL-MCNC: 10 MG/DL (ref 8.6–10.3)
CHLORIDE SERPL-SCNC: 96 MMOL/L (ref 98–110)
CHOLEST SERPL-MCNC: 193 MG/DL
CHOLEST/HDLC SERPL: 3 (CALC)
CO2 SERPL-SCNC: 33 MMOL/L (ref 20–32)
CREAT SERPL-MCNC: 1.07 MG/DL (ref 0.6–1.26)
EGFRCR SERPLBLD CKD-EPI 2021: 92 ML/MIN/1.73M2
EOSINOPHIL # BLD AUTO: 133 CELLS/UL (ref 15–500)
EOSINOPHIL NFR BLD AUTO: 2.6 %
ERYTHROCYTE [DISTWIDTH] IN BLOOD BY AUTOMATED COUNT: 13.6 % (ref 11–15)
GLUCOSE SERPL-MCNC: 83 MG/DL (ref 65–99)
HCT VFR BLD AUTO: 50.1 % (ref 38.5–50)
HDLC SERPL-MCNC: 64 MG/DL
HGB BLD-MCNC: 17.3 G/DL (ref 13.2–17.1)
LDLC SERPL CALC-MCNC: 113 MG/DL (CALC)
LYMPHOCYTES # BLD AUTO: 2310 CELLS/UL (ref 850–3900)
LYMPHOCYTES NFR BLD AUTO: 45.3 %
MCH RBC QN AUTO: 31.1 PG (ref 27–33)
MCHC RBC AUTO-ENTMCNC: 34.5 G/DL (ref 32–36)
MCV RBC AUTO: 89.9 FL (ref 80–100)
MONOCYTES # BLD AUTO: 500 CELLS/UL (ref 200–950)
MONOCYTES NFR BLD AUTO: 9.8 %
NEUTROPHILS # BLD AUTO: 2096 CELLS/UL (ref 1500–7800)
NEUTROPHILS NFR BLD AUTO: 41.1 %
NONHDLC SERPL-MCNC: 129 MG/DL (CALC)
PLATELET # BLD AUTO: 183 THOUSAND/UL (ref 140–400)
PMV BLD REES-ECKER: 11 FL (ref 7.5–12.5)
POTASSIUM SERPL-SCNC: 3.4 MMOL/L (ref 3.5–5.3)
PROT SERPL-MCNC: 7.6 G/DL (ref 6.1–8.1)
PSA SERPL-MCNC: 0.71 NG/ML
RBC # BLD AUTO: 5.57 MILLION/UL (ref 4.2–5.8)
SODIUM SERPL-SCNC: 139 MMOL/L (ref 135–146)
TRIGL SERPL-MCNC: 71 MG/DL
WBC # BLD AUTO: 5.1 THOUSAND/UL (ref 3.8–10.8)

## 2025-02-28 RX ORDER — POTASSIUM CHLORIDE 750 MG/1
10 TABLET, FILM COATED, EXTENDED RELEASE ORAL DAILY
Qty: 30 TABLET | Refills: 11 | Status: SHIPPED | OUTPATIENT
Start: 2025-02-28 | End: 2026-02-28

## 2025-02-28 NOTE — RESULT ENCOUNTER NOTE
Your labs results reveal the followin) Your potassium is at the lower limits of normal. Seeing as that you're on hydrochlorothiazide, I will start you on a low dose potassium tablet to keep your levels from dropping further.     2) The remainder of your lab work is stable/normal, and unchanged from before.